# Patient Record
Sex: FEMALE | Race: WHITE
[De-identification: names, ages, dates, MRNs, and addresses within clinical notes are randomized per-mention and may not be internally consistent; named-entity substitution may affect disease eponyms.]

---

## 2019-02-15 ENCOUNTER — HOSPITAL ENCOUNTER (EMERGENCY)
Dept: HOSPITAL 62 - ER | Age: 24
LOS: 1 days | Discharge: HOME | End: 2019-02-16
Payer: OTHER GOVERNMENT

## 2019-02-15 DIAGNOSIS — R11.0: ICD-10-CM

## 2019-02-15 DIAGNOSIS — I88.9: Primary | ICD-10-CM

## 2019-02-15 DIAGNOSIS — R50.9: ICD-10-CM

## 2019-02-15 DIAGNOSIS — R53.81: ICD-10-CM

## 2019-02-15 LAB
ADD MANUAL DIFF: NO
ALBUMIN SERPL-MCNC: 3.6 G/DL (ref 3.5–5)
ALP SERPL-CCNC: 59 U/L (ref 38–126)
ALT SERPL-CCNC: 26 U/L (ref 9–52)
ANION GAP SERPL CALC-SCNC: 5 MMOL/L (ref 5–19)
APPEARANCE UR: CLEAR
APTT PPP: YELLOW S
AST SERPL-CCNC: 23 U/L (ref 14–36)
BACTERIA (WET MOUNT): (no result)
BASOPHILS # BLD AUTO: 0 10^3/UL (ref 0–0.2)
BASOPHILS NFR BLD AUTO: 0.2 % (ref 0–2)
BILIRUB DIRECT SERPL-MCNC: 0.1 MG/DL (ref 0–0.4)
BILIRUB SERPL-MCNC: 0.3 MG/DL (ref 0.2–1.3)
BILIRUB UR QL STRIP: NEGATIVE
BUN SERPL-MCNC: 10 MG/DL (ref 7–20)
CALCIUM: 8.8 MG/DL (ref 8.4–10.2)
CHLORIDE SERPL-SCNC: 106 MMOL/L (ref 98–107)
CO2 SERPL-SCNC: 28 MMOL/L (ref 22–30)
EOSINOPHIL # BLD AUTO: 0.2 10^3/UL (ref 0–0.6)
EOSINOPHIL NFR BLD AUTO: 2.5 % (ref 0–6)
EPITHELIALS (WET MOUNT): (no result)
ERYTHROCYTE [DISTWIDTH] IN BLOOD BY AUTOMATED COUNT: 11.9 % (ref 11.5–14)
GLUCOSE SERPL-MCNC: 105 MG/DL (ref 75–110)
GLUCOSE UR STRIP-MCNC: NEGATIVE MG/DL
HCT VFR BLD CALC: 34.5 % (ref 36–47)
HGB BLD-MCNC: 12 G/DL (ref 12–15.5)
KETONES UR STRIP-MCNC: NEGATIVE MG/DL
LYMPHOCYTES # BLD AUTO: 2 10^3/UL (ref 0.5–4.7)
LYMPHOCYTES NFR BLD AUTO: 23 % (ref 13–45)
MCH RBC QN AUTO: 31.1 PG (ref 27–33.4)
MCHC RBC AUTO-ENTMCNC: 34.8 G/DL (ref 32–36)
MCV RBC AUTO: 89 FL (ref 80–97)
MONOCYTES # BLD AUTO: 0.9 10^3/UL (ref 0.1–1.4)
MONOCYTES NFR BLD AUTO: 9.7 % (ref 3–13)
NEUTROPHILS # BLD AUTO: 5.7 10^3/UL (ref 1.7–8.2)
NEUTS SEG NFR BLD AUTO: 64.6 % (ref 42–78)
NITRITE UR QL STRIP: NEGATIVE
PH UR STRIP: 7 [PH] (ref 5–9)
PLATELET # BLD: 179 10^3/UL (ref 150–450)
POTASSIUM SERPL-SCNC: 4.2 MMOL/L (ref 3.6–5)
PROT SERPL-MCNC: 6.2 G/DL (ref 6.3–8.2)
PROT UR STRIP-MCNC: NEGATIVE MG/DL
RBC # BLD AUTO: 3.86 10^6/UL (ref 3.72–5.28)
RBCS (WET MOUNT): (no result)
SODIUM SERPL-SCNC: 139.3 MMOL/L (ref 137–145)
SP GR UR STRIP: 1.02
T.VAGINALIS (WET MOUNT): (no result)
TOTAL CELLS COUNTED % (AUTO): 100 %
UROBILINOGEN UR-MCNC: NEGATIVE MG/DL (ref ?–2)
WBC # BLD AUTO: 8.8 10^3/UL (ref 4–10.5)
WBCS (WET MOUNT): (no result)
YEAST (WET MOUNT): (no result)

## 2019-02-15 PROCEDURE — 87491 CHLMYD TRACH DNA AMP PROBE: CPT

## 2019-02-15 PROCEDURE — 87591 N.GONORRHOEAE DNA AMP PROB: CPT

## 2019-02-15 PROCEDURE — 81001 URINALYSIS AUTO W/SCOPE: CPT

## 2019-02-15 PROCEDURE — 87040 BLOOD CULTURE FOR BACTERIA: CPT

## 2019-02-15 PROCEDURE — 85025 COMPLETE CBC W/AUTO DIFF WBC: CPT

## 2019-02-15 PROCEDURE — 99284 EMERGENCY DEPT VISIT MOD MDM: CPT

## 2019-02-15 PROCEDURE — 96365 THER/PROPH/DIAG IV INF INIT: CPT

## 2019-02-15 PROCEDURE — 86592 SYPHILIS TEST NON-TREP QUAL: CPT

## 2019-02-15 PROCEDURE — 80053 COMPREHEN METABOLIC PANEL: CPT

## 2019-02-15 PROCEDURE — 36415 COLL VENOUS BLD VENIPUNCTURE: CPT

## 2019-02-15 PROCEDURE — 87210 SMEAR WET MOUNT SALINE/INK: CPT

## 2019-02-15 PROCEDURE — 84703 CHORIONIC GONADOTROPIN ASSAY: CPT

## 2019-02-15 PROCEDURE — 74177 CT ABD & PELVIS W/CONTRAST: CPT

## 2019-02-15 NOTE — ER DOCUMENT REPORT
ED General





- General


Chief Complaint: Vaginal Pain


Stated Complaint: ABDOMINAL PAIN


Time Seen by Provider: 02/15/19 21:07


TRAVEL OUTSIDE OF THE [place] IN LAST 30 DAYS: No





- HPI


Notes: 





Patient presents emergency department for evaluation of swelling in the left 

inguinal region as we;; as fever.  She states that she she noticed the swelling 

approximately 1 week ago.  It has gotten somewhat bigger.  She was seen at an 

outside facility.  They did not perform a pelvic exam.  She states they "did 

blood work" but she was not told any results.  She was not given any medications

with the exception of an anti-inflammatory.  She states that since then she has 

had some nausea.  For the last 3 days she has felt intermittently fevered, 

particularly at night.  She denies any vaginal discharge.  She is currently 

sexually active.  She denies any history of STDs.





- Related Data


Allergies/Adverse Reactions: 


                                        





No Known Allergies Allergy (Unverified 02/15/19 21:55)


   











Past Medical History





- General


Information source: Patient





- Social History


Smoking Status: Never Smoker


Frequency of alcohol use: Rare


Drug Abuse: None


Family History: Reviewed & Not Pertinent





- Medical History


Medical History: Negative





Review of Systems





- Review of Systems


Constitutional: Fever, Malaise


EENT: No symptoms reported


Cardiovascular: No symptoms reported


Respiratory: No symptoms reported


Gastrointestinal: Nausea


Genitourinary: denies: Burning, Dysuria, Discharge, Frequency, Flank pain, 

Hematuria, Urgency


Female Genitourinary: Other - Currently menstruating


Skin: No symptoms reported


Neurological/Psychological: No symptoms reported





Physical Exam





- Vital signs


Vitals: 


                                        











Temp Pulse Resp BP Pulse Ox


 


 101.3 F H  96   22 H  117/64   99 


 


 02/15/19 20:47  02/15/19 20:47  02/15/19 20:47  02/15/19 20:47  02/15/19 20:47











Notes: 





Patient is febrile, mildly tachycardic, otherwise vitals within normal limits





- Notes


Notes: 





Vital signs reviewed, please refer to chart.  Patient is normocephalic, 

atraumatic.  Pupils equal round, reactive to light.  Neck is supple without 

meningismus.  Heart is regular rate and rhythm.  Lungs are clear to auscultation

bilaterally.  Abdomen is soft, nontender, normoactive bowel sounds throughout.  

Extremities without cyanosis, clubbing, edema.  Peripheral pulses are equal.  

Skin is warm and dry.  Patient is awake, alert, neurological exam is nonfocal.  

Examination of the inguinal region reveals an approximately 5 x 3 cm rubbery 

nodule consistent with inguinal adenopathy on the left.  It is tender to 

palpation.  There are no overlying skin changes.  The right inguinal region 

yields multiple smaller shottyand tender lymph nodes.  Again no overlying skin 

changes noted.  She does shave her pubic hair.





Course





- Re-evaluation


Re-evalutation: 





02/15/19 22:59


Patient presents emergency department for evaluation of inguinal lymphadenopathy

as well as fever.  She denies any vaginal discharge.  Pelvic exam performed.  

Patient offered STD treatment.  CT of the abdomen and pelvis ordered.  

Laboratory investigations are unremarkable.  RPR pending at this time.


02/15/19 23:35


Patient was further medicated.  Her fever did come down.  CT scan was 

unremarkable for anything besides the localized adenopathy.  I did not see any 

findings on pelvic exam per significantly concerning for STD, but she was 

covered with Rocephin and Zithromax.  At this point I wonder if this is 

localized lymphadenopathy secondary to minor skin infection.  We did discuss the

fact that shaving in this area may have opened her to normal skin yobani causing 

her an issue.  I will go ahead and write her a prescription for doxycycline as 

well.  She is to follow-up next week, return to the emergency department with 

worsening or new concerning symptoms of any sort.





- Vital Signs


Vital signs: 


                                        











Temp Pulse Resp BP Pulse Ox


 


 101.3 F H  96   22 H  117/64   99 


 


 02/15/19 20:47  02/15/19 20:47  02/15/19 20:47  02/15/19 20:47  02/15/19 20:47














- Laboratory


Result Diagrams: 


                                 02/15/19 21:48





                                 02/15/19 21:48


Laboratory results interpreted by me: 


                                        











  02/15/19 02/15/19 02/15/19





  21:48 21:48 21:48


 


Hct  34.5 L  


 


Total Protein   6.2 L 


 


Urine Blood    MODERATE H


 


Ur Leukocyte Esterase    TRACE H











02/15/19 22:58


Largely unremarkable





Discharge





- Discharge


Clinical Impression: 


 Lymphadenitis, Fever





Instructions:  Lymphadenopathy ([facility] / [facility])


Additional Instructions: 


He had multiple tests performed here in the emergency department.  CT scan did 

not reveal any acute findings.  Blood work was unremarkable.  Your STI testing 

is still pending at this time.  You will be contacted if it should become 

positive.  We are treating you with an antibiotic to cover for possible skin 

infection.  If this lymph node does not decrease in size, it is vital that you 

follow-up for further evaluation.  Return to the emergency department with 

worsening or new concerning symptoms.

## 2019-02-15 NOTE — RADIOLOGY REPORT (SQ)
CT ABDOMEN PELVIS WITH IV CONTRAST



HISTORY: Left lower quadrant pain.



COMPARISON: None.



TECHNIQUE: CT scan of the abdomen and pelvis with IV contrast.

This exam was performed according to our departmental

dose-optimization program, which includes automated exposure

control, adjustment of the mA and/or kV according to patient size

and/or use of iterative reconstruction technique.



FINDINGS:



The lung bases are clear. No pleural or pericardial effusions.

There is no hiatal hernia.



The gallbladder is contracted, limiting evaluation. The liver,

spleen, pancreas, adrenal glands, and kidneys are unremarkable.

The pelvic organs are also unremarkable. A small amount of free

fluid is present in the pelvic cul-de-sac, likely physiologic.



No small bowel obstruction is seen. There is no evidence of acute

diverticulitis. The appendix is normal. Left inguinal adenopathy

is present, measuring up to 2.3 cm in short axis. No intracranial

free air is identified.



No acute osseous findings. The aorta and IVC are normal. No body

wall hernia is appreciated.



IMPRESSION:



1.  Left inguinal adenopathy with surrounding inflammatory

stranding, of unknown etiology.

2.  No intra-abdominal or pelvic abnormality is seen.

## 2019-02-16 VITALS — SYSTOLIC BLOOD PRESSURE: 118 MMHG | DIASTOLIC BLOOD PRESSURE: 70 MMHG

## 2019-02-16 LAB
CHLAM PCR: DETECTED
GON PCR: NOT DETECTED